# Patient Record
Sex: MALE | Race: OTHER | HISPANIC OR LATINO | URBAN - METROPOLITAN AREA
[De-identification: names, ages, dates, MRNs, and addresses within clinical notes are randomized per-mention and may not be internally consistent; named-entity substitution may affect disease eponyms.]

---

## 2017-02-01 ENCOUNTER — OUTPATIENT (OUTPATIENT)
Dept: OUTPATIENT SERVICES | Facility: HOSPITAL | Age: 62
LOS: 1 days | End: 2017-02-01
Payer: COMMERCIAL

## 2017-02-01 PROCEDURE — 75574 CT ANGIO HRT W/3D IMAGE: CPT | Mod: 26

## 2017-02-01 PROCEDURE — 75574 CT ANGIO HRT W/3D IMAGE: CPT

## 2018-07-19 ENCOUNTER — APPOINTMENT (OUTPATIENT)
Dept: UROLOGY | Facility: CLINIC | Age: 63
End: 2018-07-19

## 2018-08-24 ENCOUNTER — APPOINTMENT (OUTPATIENT)
Dept: UROLOGY | Facility: CLINIC | Age: 63
End: 2018-08-24
Payer: MEDICARE

## 2018-08-24 PROCEDURE — 99205 OFFICE O/P NEW HI 60 MIN: CPT

## 2018-08-24 RX ORDER — FINASTERIDE 5 MG/1
5 TABLET, FILM COATED ORAL DAILY
Qty: 90 | Refills: 3 | Status: ACTIVE | COMMUNITY
Start: 2018-08-24 | End: 1900-01-01

## 2018-08-27 LAB — PSA SERPL-MCNC: <0.01 NG/ML

## 2019-05-09 ENCOUNTER — APPOINTMENT (OUTPATIENT)
Dept: UROLOGY | Facility: CLINIC | Age: 64
End: 2019-05-09
Payer: MEDICARE

## 2019-05-09 VITALS — HEART RATE: 73 BPM | SYSTOLIC BLOOD PRESSURE: 121 MMHG | DIASTOLIC BLOOD PRESSURE: 69 MMHG | TEMPERATURE: 98.3 F

## 2019-05-09 PROCEDURE — 99213 OFFICE O/P EST LOW 20 MIN: CPT

## 2019-05-09 NOTE — PHYSICAL EXAM
[Urinary Bladder Findings] : the bladder was normal on palpation [Penis Abnormality] : normal uncircumcised penis [Urethral Meatus] : meatus normal [Epididymis] : the epididymides were normal [Scrotum] : the scrotum was normal [Testes Tenderness] : no tenderness of the testes [FreeTextEntry1] : limited SFPL. ventral plaque nontender [Testes Mass (___cm)] : there were no testicular masses

## 2019-05-09 NOTE — ASSESSMENT
[FreeTextEntry1] : peyronies diesease\par no curvature\par no interest in treatment\par will monitor\par \par prostate cancer\par PSA today\par f/u 1 year

## 2019-05-09 NOTE — HISTORY OF PRESENT ILLNESS
[FreeTextEntry1] : RALP 2012\par PSA undetectable\par mild ED\par no interest PDE5i\par lump in penis x 1 year\par no curvature\par

## 2019-05-13 LAB — PSA SERPL-MCNC: <0.01 NG/ML

## 2019-07-12 ENCOUNTER — APPOINTMENT (OUTPATIENT)
Dept: UROLOGY | Facility: CLINIC | Age: 64
End: 2019-07-12
Payer: MEDICARE

## 2019-07-12 VITALS — HEART RATE: 73 BPM | SYSTOLIC BLOOD PRESSURE: 146 MMHG | DIASTOLIC BLOOD PRESSURE: 89 MMHG

## 2019-07-12 PROCEDURE — 99213 OFFICE O/P EST LOW 20 MIN: CPT

## 2019-07-12 NOTE — HISTORY OF PRESENT ILLNESS
[FreeTextEntry1] : complained of dysuria few weeks ago\par symptoms resovled but are intermtitent\par told may have urianry infectons vs kidney stone\par no flank pain\par no nausea vomting\par

## 2019-07-22 LAB
APPEARANCE: CLEAR
BACTERIA UR CULT: NORMAL
BACTERIA: NEGATIVE
BILIRUBIN URINE: NEGATIVE
BLOOD URINE: ABNORMAL
COLOR: YELLOW
GLUCOSE QUALITATIVE U: NEGATIVE
HYALINE CASTS: 0 /LPF
KETONES URINE: NEGATIVE
LEUKOCYTE ESTERASE URINE: NEGATIVE
MICROSCOPIC-UA: NORMAL
NITRITE URINE: NEGATIVE
PH URINE: 6.5
PROTEIN URINE: NEGATIVE
RED BLOOD CELLS URINE: 12 /HPF
SPECIFIC GRAVITY URINE: 1.02
SQUAMOUS EPITHELIAL CELLS: 0 /HPF
UROBILINOGEN URINE: NORMAL
WHITE BLOOD CELLS URINE: 1 /HPF

## 2019-07-24 ENCOUNTER — RESULT REVIEW (OUTPATIENT)
Age: 64
End: 2019-07-24

## 2019-08-01 ENCOUNTER — FORM ENCOUNTER (OUTPATIENT)
Age: 64
End: 2019-08-01

## 2019-08-02 ENCOUNTER — APPOINTMENT (OUTPATIENT)
Dept: CT IMAGING | Facility: HOSPITAL | Age: 64
End: 2019-08-02
Payer: MEDICARE

## 2019-08-02 ENCOUNTER — OUTPATIENT (OUTPATIENT)
Dept: OUTPATIENT SERVICES | Facility: HOSPITAL | Age: 64
LOS: 1 days | End: 2019-08-02
Payer: MEDICARE

## 2019-08-02 PROCEDURE — 74178 CT ABD&PLV WO CNTR FLWD CNTR: CPT

## 2019-08-02 PROCEDURE — 74178 CT ABD&PLV WO CNTR FLWD CNTR: CPT | Mod: 26

## 2020-07-06 ENCOUNTER — TRANSCRIPTION ENCOUNTER (OUTPATIENT)
Age: 65
End: 2020-07-06

## 2020-07-17 ENCOUNTER — APPOINTMENT (OUTPATIENT)
Dept: UROLOGY | Facility: CLINIC | Age: 65
End: 2020-07-17
Payer: MEDICARE

## 2020-07-17 VITALS
SYSTOLIC BLOOD PRESSURE: 132 MMHG | TEMPERATURE: 97.7 F | DIASTOLIC BLOOD PRESSURE: 86 MMHG | WEIGHT: 153 LBS | BODY MASS INDEX: 25.49 KG/M2 | HEART RATE: 88 BPM | HEIGHT: 65 IN

## 2020-07-17 PROCEDURE — 99213 OFFICE O/P EST LOW 20 MIN: CPT

## 2020-07-17 NOTE — HISTORY OF PRESENT ILLNESS
[FreeTextEntry1] : s/p RALP 2012\par PSA undetectable last 5/2019\par CT scal 8/2019 small nonobstructing stones bilaterally\par no gross hematuria\par micorhematuria at last visit never proceeded to cysto\par bothereed by plaque on penis\par

## 2020-07-17 NOTE — ASSESSMENT
[FreeTextEntry1] : micorhematuria \par reiterated needed for cystoscopy\par will consider\par UA UCX today\par PSA with PCP\par f/u 1 year

## 2020-07-17 NOTE — PHYSICAL EXAM
[Penis Abnormality] : normal uncircumcised penis [FreeTextEntry1] : limited stretched flaccid length

## 2020-07-17 NOTE — LETTER BODY
[Dear  ___] : Dear  [unfilled], [FreeTextEntry1] : I saw  INGRID NARAYAN back in follow up today. Please see the attached note for full details.\par \par Thank you very much for allowing me to participate in the care of this patient. If you have any questions please feel free to call me at any time. \par \par \par Sincerely yours,\par \par \par \par Naveed Lopez MD, LAURIE\par Director, Male Fertility and Microsurgery\par  of Urology\par [facility]\par \par \par This letter has been dictated using computer software but not reviewed to expedite transmission.\par  [FreeTextEntry2] : miguel Macias MD\par [facility]\par [address]\[place] [address]

## 2020-07-21 LAB
APPEARANCE: CLEAR
BACTERIA UR CULT: NORMAL
BACTERIA: NEGATIVE
BILIRUBIN URINE: NEGATIVE
BLOOD URINE: NEGATIVE
CALCIUM OXALATE CRYSTALS: ABNORMAL
COLOR: YELLOW
GLUCOSE QUALITATIVE U: ABNORMAL
HYALINE CASTS: 0 /LPF
KETONES URINE: NEGATIVE
LEUKOCYTE ESTERASE URINE: NEGATIVE
MICROSCOPIC-UA: NORMAL
NITRITE URINE: NEGATIVE
PH URINE: 6.5
PROTEIN URINE: NEGATIVE
RED BLOOD CELLS URINE: 3 /HPF
SPECIFIC GRAVITY URINE: 1.02
SQUAMOUS EPITHELIAL CELLS: 0 /HPF
UROBILINOGEN URINE: NORMAL
WHITE BLOOD CELLS URINE: 1 /HPF

## 2020-07-28 LAB — URINE CYTOLOGY: NORMAL

## 2020-08-06 ENCOUNTER — APPOINTMENT (OUTPATIENT)
Dept: UROLOGY | Facility: CLINIC | Age: 65
End: 2020-08-06
Payer: MEDICARE

## 2020-08-06 VITALS — TEMPERATURE: 98.8 F | SYSTOLIC BLOOD PRESSURE: 164 MMHG | HEART RATE: 79 BPM | DIASTOLIC BLOOD PRESSURE: 90 MMHG

## 2020-08-06 PROCEDURE — 52000 CYSTOURETHROSCOPY: CPT

## 2020-08-11 LAB — PSA SERPL-MCNC: 0.03 NG/ML

## 2021-07-16 ENCOUNTER — APPOINTMENT (OUTPATIENT)
Dept: UROLOGY | Facility: CLINIC | Age: 66
End: 2021-07-16

## 2021-12-01 ENCOUNTER — NON-APPOINTMENT (OUTPATIENT)
Age: 66
End: 2021-12-01

## 2021-12-15 ENCOUNTER — APPOINTMENT (OUTPATIENT)
Dept: NEPHROLOGY | Facility: CLINIC | Age: 66
End: 2021-12-15
Payer: MEDICARE

## 2021-12-15 VITALS
DIASTOLIC BLOOD PRESSURE: 78 MMHG | WEIGHT: 155 LBS | SYSTOLIC BLOOD PRESSURE: 133 MMHG | BODY MASS INDEX: 25.83 KG/M2 | HEART RATE: 76 BPM | HEIGHT: 65 IN

## 2021-12-15 DIAGNOSIS — Z63.5 DISRUPTION OF FAMILY BY SEPARATION AND DIVORCE: ICD-10-CM

## 2021-12-15 DIAGNOSIS — H93.19 TINNITUS, UNSPECIFIED EAR: ICD-10-CM

## 2021-12-15 DIAGNOSIS — E78.5 HYPERLIPIDEMIA, UNSPECIFIED: ICD-10-CM

## 2021-12-15 PROCEDURE — 99204 OFFICE O/P NEW MOD 45 MIN: CPT

## 2021-12-15 SDOH — SOCIAL STABILITY - SOCIAL INSECURITY: DISRUPTION OF FAMILY BY SEPARATION AND DIVORCE: Z63.5

## 2021-12-15 NOTE — CONSULT LETTER
[Dear  ___] : Dear  [unfilled], [Consult Letter:] : I had the pleasure of evaluating your patient, [unfilled]. [Please see my note below.] : Please see my note below. [Consult Closing:] : Thank you very much for allowing me to participate in the care of this patient.  If you have any questions, please do not hesitate to contact me. [Sincerely,] : Sincerely, [FreeTextEntry2] : Baljit [FreeTextEntry3] : Sincerely, \par \par Vinicius Monreal MD, FACP

## 2021-12-15 NOTE — HISTORY OF PRESENT ILLNESS
[FreeTextEntry1] : 66-year-old man with a history of bilateral kidney stones, measuring 2 to 3 mm on CT in August 2019.  He is referred now by Dr. Smiley , because of a urinalysis showing occult blood but only 0-2 RBCs.  His creatinine last month was 1.0 with a GFR of 76.  His K was 4.8.  His recent urine pH was 5 with uric acid crystals noted.  No stone has ever been identified or analyzed.  He has a history of prostate cancer, status post surgery about 10 years ago by Dr. Lopez.  He denies any flank pain or gross hematuria.  He recalls perhaps one UTI several years ago but not on a recurrent basis.  His BP has generally been normal in the past -it was 132/86 recently at Dr. SUERO .  He has hyperlipidemia treated with rosuvastatin.

## 2021-12-15 NOTE — ASSESSMENT
[FreeTextEntry1] : 66-year-old man with microscopic hematuria, in the context of prior small bilateral kidney stones -judging by the urine pH of 5 and uric acid crystals on a previous UA, I suspect that uric acid is at least partially involved in the composition of the stones.  He is not currently symptomatic from them.  I have ordered a kidney and bladder ultrasound, and labs to include BMP, urinalysis, urine cytology, and urine microalbumin.  We will watch his BP which is borderline, and may need 24-hour ambulatory monitoring once we have the stone/hematuria situation under control.  We will likely also get a 24-hour urine for Litholink evaluation of potential stone forming factors.  He will return in about 4 months pending outcome of the current evaluation.

## 2021-12-15 NOTE — PHYSICAL EXAM
[General Appearance - Alert] : alert [General Appearance - In No Acute Distress] : in no acute distress [Sclera] : the sclera and conjunctiva were normal [PERRL With Normal Accommodation] : pupils were equal in size, round, and reactive to light [Outer Ear] : the ears and nose were normal in appearance [Neck Appearance] : the appearance of the neck was normal [Neck Cervical Mass (___cm)] : no neck mass was observed [Jugular Venous Distention Increased] : there was no jugular-venous distention [Auscultation Breath Sounds / Voice Sounds] : lungs were clear to auscultation bilaterally [Heart Rate And Rhythm] : heart rate was normal and rhythm regular [Heart Sounds] : normal S1 and S2 [Heart Sounds Gallop] : no gallops [Murmurs] : no murmurs [Heart Sounds Pericardial Friction Rub] : no pericardial rub [Edema] : there was no peripheral edema [No CVA Tenderness] : no ~M costovertebral angle tenderness [Abnormal Walk] : normal gait [Nail Clubbing] : no clubbing  or cyanosis of the fingernails [Musculoskeletal - Swelling] : no joint swelling seen [Motor Tone] : muscle strength and tone were normal [Skin Color & Pigmentation] : normal skin color and pigmentation [Skin Turgor] : normal skin turgor [] : no rash [Deep Tendon Reflexes (DTR)] : deep tendon reflexes were 2+ and symmetric [No Focal Deficits] : no focal deficits [Oriented To Time, Place, And Person] : oriented to person, place, and time [Impaired Insight] : insight and judgment were intact [Affect] : the affect was normal 2018 13:20

## 2021-12-16 ENCOUNTER — APPOINTMENT (OUTPATIENT)
Dept: UROLOGY | Facility: CLINIC | Age: 66
End: 2021-12-16
Payer: MEDICARE

## 2021-12-16 VITALS — TEMPERATURE: 97.9 F | DIASTOLIC BLOOD PRESSURE: 89 MMHG | HEART RATE: 71 BPM | SYSTOLIC BLOOD PRESSURE: 151 MMHG

## 2021-12-16 PROCEDURE — 99213 OFFICE O/P EST LOW 20 MIN: CPT

## 2021-12-16 NOTE — HISTORY OF PRESENT ILLNESS
[FreeTextEntry1] : Mr. Perales is here for follow up \par \par s/p RALP 2012\par PSA undetectable last 1/12/20211 (done elsewhere and reviewed on patient's phone)\par CT scal 8/2019 small nonobstructing stones bilaterally\par \par \par bothereed by plaque on penis\par \par UA 11//23/2021 +2 RBCs\par \par \par renal and bladder ultrasound on 12/16/2021\par BILATERAL Non obstructing RENAL CALCULI\par RIGHT kidney: 4 mm and 6 mm lower polar stones\par left kidney 9 mm mid polar stones \par PVR: 17\par \par negative previous microhematuria workup

## 2021-12-16 NOTE — ASSESSMENT
[FreeTextEntry1] : microhematuria\par small nonobstucting stones in kidney\par repeat renal ultrasound 1 year\par \par prostate cancer\par repeat PSA 1year\par

## 2022-04-14 ENCOUNTER — APPOINTMENT (OUTPATIENT)
Dept: NEPHROLOGY | Facility: CLINIC | Age: 67
End: 2022-04-14
Payer: MEDICARE

## 2022-04-14 VITALS
HEIGHT: 65 IN | SYSTOLIC BLOOD PRESSURE: 124 MMHG | HEART RATE: 72 BPM | WEIGHT: 152 LBS | DIASTOLIC BLOOD PRESSURE: 78 MMHG | BODY MASS INDEX: 25.33 KG/M2

## 2022-04-14 DIAGNOSIS — I34.0 NONRHEUMATIC MITRAL (VALVE) INSUFFICIENCY: ICD-10-CM

## 2022-04-14 PROCEDURE — 99215 OFFICE O/P EST HI 40 MIN: CPT

## 2022-04-14 NOTE — PHYSICAL EXAM
[General Appearance - Alert] : alert [General Appearance - In No Acute Distress] : in no acute distress [Sclera] : the sclera and conjunctiva were normal [PERRL With Normal Accommodation] : pupils were equal in size, round, and reactive to light [Outer Ear] : the ears and nose were normal in appearance [Neck Appearance] : the appearance of the neck was normal [Neck Cervical Mass (___cm)] : no neck mass was observed [Jugular Venous Distention Increased] : there was no jugular-venous distention [Auscultation Breath Sounds / Voice Sounds] : lungs were clear to auscultation bilaterally [Heart Rate And Rhythm] : heart rate was normal and rhythm regular [Heart Sounds] : normal S1 and S2 [Heart Sounds Gallop] : no gallops [Murmurs] : no murmurs [Heart Sounds Pericardial Friction Rub] : no pericardial rub [Edema] : there was no peripheral edema [No CVA Tenderness] : no ~M costovertebral angle tenderness [Abnormal Walk] : normal gait [Musculoskeletal - Swelling] : no joint swelling seen [Nail Clubbing] : no clubbing  or cyanosis of the fingernails [Motor Tone] : muscle strength and tone were normal [Skin Color & Pigmentation] : normal skin color and pigmentation [Skin Turgor] : normal skin turgor [] : no rash [Deep Tendon Reflexes (DTR)] : deep tendon reflexes were 2+ and symmetric [No Focal Deficits] : no focal deficits [Oriented To Time, Place, And Person] : oriented to person, place, and time [Impaired Insight] : insight and judgment were intact [Affect] : the affect was normal

## 2022-04-14 NOTE — HISTORY OF PRESENT ILLNESS
[FreeTextEntry1] : 66-year-old man with a history of bilateral kidney stones seen on CT in August 2019.  He was referred last year by Dr. Smiley with a urinalysis showing occult blood but the sediment containing only 0-2 RBCs.  At that time his creatinine was 1.0 with a GFR of 76, K4.8, urine pH 5 with uric acid crystals noted.  He also has a history of prostate cancer.  When I last saw him in December, he denied any flank pain or gross hematuria.  His BP was 133/78.  He went to the [place] after that and experienced flank pain, prompting evaluation.  He was found to have 3+ blood on urinalysis with no protein, creatinine 1.02, BUN 15.  A CT scan on March 2 showed calcium containing stones in both right and left kidneys.  He then passed 2 stones spontaneously, which were analyzed and 100% calcium oxalate.  He was begun on sodium bicarbonate 500 mg daily and potassium citrate 99 mg twice daily, but he has only been taking it once daily.  He was told to avoid dairy products as much as possible and to drink more fluid.  I reviewed his films and it appears there may be a small stone still remaining in the left kidney.  He has no left flank pain but does have occasional very mild discomfort in the right lower back area.  He has very minimal dysuria occasionally.

## 2022-04-14 NOTE — ASSESSMENT
[FreeTextEntry1] : 66-year-old man with a history of nephrolithiasis -he passed 2 calcium oxalate stones last month in the [place].  He is now on sodium bicarbonate and potassium citrate, but the dose of the latter is very low.  I am sending him for urinalysis and U ACR today.  I want to see if he has microalbuminuria -dipstick showed no protein recently.  Also, we will see if there are any red cells or crystals noted.  He previously had uric acid crystals noted on urinalysis.  In addition, I ordered a 24-hour urine from [facility] so we can assess all of the physical chemical properties that may act as risk factors for stone forming -pH, calcium, oxalate, phosphorus, citrate, etc.  I emphasized the need for high fluid intake he has already cut back on his dairy products and is drinking soy milk instead of cow's milk.  He will return in 5 weeks and we will review his Litholink results.  I suspect he will need more potassiums citrate.   I have asked him to take his current dose twice daily.  Our goal will probably be to get his urine citrate up to about 500 mg.  Time spent 45 minutes.

## 2022-04-18 LAB
APPEARANCE: CLEAR
BACTERIA: NEGATIVE
BILIRUBIN URINE: NEGATIVE
BLOOD URINE: ABNORMAL
COLOR: NORMAL
CREAT SPEC-SCNC: 116 MG/DL
GLUCOSE QUALITATIVE U: NEGATIVE
HYALINE CASTS: 0 /LPF
KETONES URINE: NEGATIVE
LEUKOCYTE ESTERASE URINE: NEGATIVE
MICROALBUMIN 24H UR DL<=1MG/L-MCNC: <1.2 MG/DL
MICROALBUMIN/CREAT 24H UR-RTO: NORMAL MG/G
MICROSCOPIC-UA: NORMAL
NITRITE URINE: NEGATIVE
PH URINE: 7
PROTEIN URINE: NEGATIVE
RED BLOOD CELLS URINE: 13 /HPF
SPECIFIC GRAVITY URINE: 1.02
SQUAMOUS EPITHELIAL CELLS: 0 /HPF
UROBILINOGEN URINE: NORMAL
WHITE BLOOD CELLS URINE: 0 /HPF

## 2022-05-26 ENCOUNTER — APPOINTMENT (OUTPATIENT)
Dept: UROLOGY | Facility: CLINIC | Age: 67
End: 2022-05-26
Payer: MEDICARE

## 2022-05-26 ENCOUNTER — APPOINTMENT (OUTPATIENT)
Dept: NEPHROLOGY | Facility: CLINIC | Age: 67
End: 2022-05-26
Payer: MEDICARE

## 2022-05-26 VITALS
HEART RATE: 69 BPM | DIASTOLIC BLOOD PRESSURE: 79 MMHG | SYSTOLIC BLOOD PRESSURE: 117 MMHG | TEMPERATURE: 97.9 F | OXYGEN SATURATION: 97 %

## 2022-05-26 VITALS — HEIGHT: 65 IN | WEIGHT: 162 LBS | BODY MASS INDEX: 26.99 KG/M2

## 2022-05-26 DIAGNOSIS — N20.0 CALCULUS OF KIDNEY: ICD-10-CM

## 2022-05-26 DIAGNOSIS — Z80.49 FAMILY HISTORY OF MALIGNANT NEOPLASM OF OTHER GENITAL ORGANS: ICD-10-CM

## 2022-05-26 DIAGNOSIS — Z82.49 FAMILY HISTORY OF ISCHEMIC HEART DISEASE AND OTHER DISEASES OF THE CIRCULATORY SYSTEM: ICD-10-CM

## 2022-05-26 DIAGNOSIS — N39.41 URGE INCONTINENCE: ICD-10-CM

## 2022-05-26 DIAGNOSIS — E78.00 PURE HYPERCHOLESTEROLEMIA, UNSPECIFIED: ICD-10-CM

## 2022-05-26 DIAGNOSIS — Z85.46 PERSONAL HISTORY OF MALIGNANT NEOPLASM OF PROSTATE: ICD-10-CM

## 2022-05-26 DIAGNOSIS — R10.2 PELVIC AND PERINEAL PAIN: ICD-10-CM

## 2022-05-26 PROCEDURE — 99214 OFFICE O/P EST MOD 30 MIN: CPT

## 2022-05-26 PROCEDURE — 99213 OFFICE O/P EST LOW 20 MIN: CPT

## 2022-05-26 NOTE — ASSESSMENT
[FreeTextEntry1] : Prostate cancer \par PSA <0.Undetectable last Dec  2021\par repeat PSA  in a year \par \par Urinary urge incontinence \par Informed of treatment options\par mildly bothersome - no interest in medication\par continue Kegel exercise \par \par Pelvic Pain\par Discussed causes and treatment options\par Advised warm tub baths 15 - 20 mins daily\par NSAIDs for discomfort\par Informed of Alpha blocker - no interest \par if pain or discomfort persists, will consider pelvic floor physical therapy. \par \par labs: UA, UC, PSA \par \par Follow up yearly

## 2022-05-26 NOTE — PHYSICAL EXAM
[General Appearance - Well Developed] : well developed [General Appearance - Well Nourished] : well nourished [Normal Appearance] : normal appearance [Well Groomed] : well groomed [General Appearance - In No Acute Distress] : no acute distress [Abdomen Soft] : soft [Abdomen Tenderness] : non-tender [Costovertebral Angle Tenderness] : no ~M costovertebral angle tenderness [Urethral Meatus] : meatus normal [Penis Abnormality] : normal uncircumcised penis [Urinary Bladder Findings] : the bladder was normal on palpation [Scrotum] : the scrotum was normal [Testes Mass (___cm)] : there were no testicular masses [Edema] : no peripheral edema [] : no respiratory distress [Respiration, Rhythm And Depth] : normal respiratory rhythm and effort [Exaggerated Use Of Accessory Muscles For Inspiration] : no accessory muscle use [Oriented To Time, Place, And Person] : oriented to person, place, and time [Affect] : the affect was normal [Mood] : the mood was normal [Not Anxious] : not anxious [Normal Station and Gait] : the gait and station were normal for the patient's age [No Focal Deficits] : no focal deficits [No Palpable Adenopathy] : no palpable adenopathy [FreeTextEntry1] : palpable penile plague, bilateral 15 cc testis

## 2022-05-26 NOTE — ASSESSMENT
[FreeTextEntry1] : 66-year-old man with calcium oxalate stones -I have asked him to further increase his fluid intake, particularly during the summer months when insensible losses or greater.  I am also starting him on potassiums citrate 15 M EQ's twice daily.  He will repeat a Litholink 24-hour urine in 6 weeks to recheck volume and urinary citrate particularly.

## 2022-05-26 NOTE — HISTORY OF PRESENT ILLNESS
[FreeTextEntry1] : 66-year-old man with a history of bilateral kidney stones noted on CT 3 years ago.  He was referred by Dr. Smiley last year with microscopic hematuria.  Creatinine was 1.0 with a GFR of 76.  Uric acid crystals were noted, but he has developed calcium oxalate stones.  While visiting in the [place] he was found to have 3+ blood on urinalysis with a creatinine of 1.02 and BUN of 15.  A CT scan 2 months ago showed calcium containing stones in both kidneys, and both stones were passed spontaneously.  When analyzed, they were both 100% calcium oxalate.  He was given potassiums citrate twice a day but he was only taking it once and the dose was very tiny.  He was told to avoid dairy products and he has minimized cream cheese and regular cheese since then.  He has tried to drink more fluid.  He collected a 24-hour urine for Litholink 3 weeks ago, showing a volume of 1.84 L, with a urinary calcium of 70, urinary oxalate of 33, urinary citrate slightly low at 489, urine pH of 6.5.  He has no urinary symptoms at present.

## 2022-05-26 NOTE — CONSULT LETTER
[Dear  ___] : Dear  [unfilled], [Consult Letter:] : I had the pleasure of evaluating your patient, [unfilled]. [Please see my note below.] : Please see my note below. [Consult Closing:] : Thank you very much for allowing me to participate in the care of this patient.  If you have any questions, please do not hesitate to contact me. [Sincerely,] : Sincerely, [FreeTextEntry2] : Dr Smiley

## 2022-05-31 ENCOUNTER — NON-APPOINTMENT (OUTPATIENT)
Age: 67
End: 2022-05-31

## 2022-05-31 LAB
APPEARANCE: CLEAR
BACTERIA UR CULT: NORMAL
BACTERIA: NEGATIVE
BILIRUBIN URINE: NEGATIVE
BLOOD URINE: ABNORMAL
COLOR: NORMAL
GLUCOSE QUALITATIVE U: NEGATIVE
HYALINE CASTS: 0 /LPF
KETONES URINE: NEGATIVE
LEUKOCYTE ESTERASE URINE: NEGATIVE
MICROSCOPIC-UA: NORMAL
NITRITE URINE: NEGATIVE
PH URINE: 6.5
PROTEIN URINE: NEGATIVE
PSA SERPL-MCNC: <0.01 NG/ML
RED BLOOD CELLS URINE: 0 /HPF
SPECIFIC GRAVITY URINE: 1.01
SQUAMOUS EPITHELIAL CELLS: 0 /HPF
UROBILINOGEN URINE: NORMAL
WHITE BLOOD CELLS URINE: 0 /HPF

## 2023-05-03 ENCOUNTER — NON-APPOINTMENT (OUTPATIENT)
Age: 68
End: 2023-05-03

## 2023-05-04 ENCOUNTER — APPOINTMENT (OUTPATIENT)
Dept: NEPHROLOGY | Facility: CLINIC | Age: 68
End: 2023-05-04
Payer: MEDICARE

## 2023-05-04 VITALS
SYSTOLIC BLOOD PRESSURE: 114 MMHG | HEART RATE: 72 BPM | WEIGHT: 162 LBS | HEIGHT: 65 IN | BODY MASS INDEX: 26.99 KG/M2 | DIASTOLIC BLOOD PRESSURE: 71 MMHG

## 2023-05-04 PROCEDURE — 99214 OFFICE O/P EST MOD 30 MIN: CPT

## 2023-05-04 RX ORDER — POTASSIUM CITRATE 15 MEQ/1
15 MEQ TABLET, EXTENDED RELEASE ORAL
Qty: 180 | Refills: 1 | Status: ACTIVE | COMMUNITY
Start: 2022-05-26 | End: 1900-01-01

## 2023-05-04 NOTE — ASSESSMENT
[FreeTextEntry1] : 67-year-old man with stable renal function in the normal range, well-controlled BP, normal urinalysis with no crystals or red cells -he will be having a urology visit later this week.  He never returned for the follow-up Litholink, but ran out of potassiums citrate so I am renewing that now.  He also is not drinking quite as much fluid as he probably needs to so I reminded him to keep drinking every hour.  He will return in 9 to 12 months unless a new issue arises.

## 2023-05-04 NOTE — PHYSICAL EXAM
[General Appearance - Alert] : alert [General Appearance - In No Acute Distress] : in no acute distress [Sclera] : the sclera and conjunctiva were normal [PERRL With Normal Accommodation] : pupils were equal in size, round, and reactive to light [Outer Ear] : the ears and nose were normal in appearance [Neck Appearance] : the appearance of the neck was normal [Jugular Venous Distention Increased] : there was no jugular-venous distention [Neck Cervical Mass (___cm)] : no neck mass was observed [Auscultation Breath Sounds / Voice Sounds] : lungs were clear to auscultation bilaterally [Heart Rate And Rhythm] : heart rate was normal and rhythm regular [Heart Sounds] : normal S1 and S2 [Heart Sounds Gallop] : no gallops [Murmurs] : no murmurs [Heart Sounds Pericardial Friction Rub] : no pericardial rub [Edema] : there was no peripheral edema [No CVA Tenderness] : no ~M costovertebral angle tenderness [Abnormal Walk] : normal gait [Nail Clubbing] : no clubbing  or cyanosis of the fingernails [Musculoskeletal - Swelling] : no joint swelling seen [Motor Tone] : muscle strength and tone were normal [Skin Color & Pigmentation] : normal skin color and pigmentation [Skin Turgor] : normal skin turgor [] : no rash [Deep Tendon Reflexes (DTR)] : deep tendon reflexes were 2+ and symmetric [No Focal Deficits] : no focal deficits [Oriented To Time, Place, And Person] : oriented to person, place, and time [Impaired Insight] : insight and judgment were intact [Affect] : the affect was normal

## 2023-05-04 NOTE — CONSULT LETTER
[Dear  ___] : Dear  [unfilled], [Consult Letter:] : I had the pleasure of evaluating your patient, [unfilled]. [Please see my note below.] : Please see my note below. [Consult Closing:] : Thank you very much for allowing me to participate in the care of this patient.  If you have any questions, please do not hesitate to contact me. [Sincerely,] : Sincerely, [FreeTextEntry2] : Dr Smiley [FreeTextEntry3] : Sincerely, \par \par Vinicius Monreal MD, FACP\par

## 2023-05-04 NOTE — HISTORY OF PRESENT ILLNESS
[FreeTextEntry1] : 67-year-old man with a history of bilateral kidney stones first noted 4 years ago.  He was referred by Dr. Smiley with micro scopic hematuria.  His renal function has remained stable over the last 5 years, currently with a creatinine of 1.  0.07 and a GFR 75.  His BP has been very well controlled, cholesterol has improved dramatically with LDL falling from 133 down to 58, A1c is 5.5.  So he is metabolically in excellent shape.  Current urinalysis shows no red cells and no crystals where he has previously had calcium oxalate and uric acid crystals.

## 2023-05-05 ENCOUNTER — APPOINTMENT (OUTPATIENT)
Dept: UROLOGY | Facility: CLINIC | Age: 68
End: 2023-05-05
Payer: MEDICARE

## 2023-05-05 VITALS — SYSTOLIC BLOOD PRESSURE: 145 MMHG | HEART RATE: 83 BPM | TEMPERATURE: 97.8 F | DIASTOLIC BLOOD PRESSURE: 88 MMHG

## 2023-05-05 DIAGNOSIS — R30.0 DYSURIA: ICD-10-CM

## 2023-05-05 PROCEDURE — 99214 OFFICE O/P EST MOD 30 MIN: CPT

## 2023-05-05 NOTE — PHYSICAL EXAM
[Normal Appearance] : normal appearance [Abdomen Soft] : soft [Abdomen Tenderness] : non-tender [] : no respiratory distress [Respiration, Rhythm And Depth] : normal respiratory rhythm and effort [Oriented To Time, Place, And Person] : oriented to person, place, and time [Affect] : the affect was normal [Normal Station and Gait] : the gait and station were normal for the patient's age

## 2023-05-05 NOTE — ASSESSMENT
[FreeTextEntry1] : 67M for annual check up\par \par Prostate Cancer\par - PSA undetectable, repeat 1 year\par \par Pelvic pain\par - Sitz Baths, NSAIDs\par - Alfuzosin in refractory\par \par UA, Ucx\par review by phone.\par

## 2023-05-05 NOTE — HISTORY OF PRESENT ILLNESS
[FreeTextEntry1] : 67M here for annual follow up, Hx of PCa s/p prostatectomy, pelvic pain, incontinence\par \par - PSA undetectable as of april/2023\par \par - some mild pelvic discomfort as discussed at previous visit. Patient has not tried NSAIDs or Sitz Baths. \par - occasional dysuria and nocturia x 1-2. - no evidence of infection in UA earlier this week.  \par - otherwise is feeling well and without complaints.\par - some incontinence - needs to continue kegels

## 2023-05-08 LAB
APPEARANCE: CLEAR
BACTERIA UR CULT: NORMAL
BACTERIA: NEGATIVE /HPF
BILIRUBIN URINE: NEGATIVE
BLOOD URINE: ABNORMAL
CALCIUM OXALATE CRYSTALS: PRESENT
CAST: 0 /LPF
COLOR: YELLOW
EPITHELIAL CELLS: 0 /HPF
GLUCOSE QUALITATIVE U: NEGATIVE MG/DL
KETONES URINE: NEGATIVE MG/DL
LEUKOCYTE ESTERASE URINE: NEGATIVE
MICROSCOPIC-UA: NORMAL
NITRITE URINE: NEGATIVE
PH URINE: 7.5
PROTEIN URINE: NEGATIVE MG/DL
RED BLOOD CELLS URINE: 14 /HPF
REVIEW: NORMAL
SPECIFIC GRAVITY URINE: 1.02
UROBILINOGEN URINE: 0.2 MG/DL
WHITE BLOOD CELLS URINE: 0 /HPF

## 2023-07-27 ENCOUNTER — APPOINTMENT (OUTPATIENT)
Dept: UROLOGY | Facility: CLINIC | Age: 68
End: 2023-07-27

## 2024-03-06 ENCOUNTER — TRANSCRIPTION ENCOUNTER (OUTPATIENT)
Age: 69
End: 2024-03-06

## 2024-03-06 RX ORDER — ROSUVASTATIN CALCIUM 5 MG/1
5 TABLET, FILM COATED ORAL
Qty: 90 | Refills: 1 | Status: ACTIVE | COMMUNITY

## 2024-03-14 ENCOUNTER — APPOINTMENT (OUTPATIENT)
Dept: UROLOGY | Facility: CLINIC | Age: 69
End: 2024-03-14
Payer: MEDICARE

## 2024-03-14 ENCOUNTER — LABORATORY RESULT (OUTPATIENT)
Age: 69
End: 2024-03-14

## 2024-03-14 VITALS
HEART RATE: 73 BPM | WEIGHT: 151 LBS | TEMPERATURE: 97.8 F | DIASTOLIC BLOOD PRESSURE: 99 MMHG | SYSTOLIC BLOOD PRESSURE: 162 MMHG | BODY MASS INDEX: 25.16 KG/M2 | HEIGHT: 65 IN

## 2024-03-14 DIAGNOSIS — C61 MALIGNANT NEOPLASM OF PROSTATE: ICD-10-CM

## 2024-03-14 PROCEDURE — 99213 OFFICE O/P EST LOW 20 MIN: CPT

## 2024-03-14 NOTE — HISTORY OF PRESENT ILLNESS
[FreeTextEntry1] : INGRID NARAYAN is a 68 year M presenting on 03/14/2024 PMH: PCa s/p RALP 2012, pelvic pain, incontinence, microhematuria  Microhematuria last year, never did workup.  h/o kidney stones 1x, passed on own. Has stopped taking potassium citrate. No urinary concerns. Sometimes feels ache in testicles. Similar to last visit. Hasn't been doing sitz baths. No ED concerns. Not interested in medication at this time.  No h/o tobacco  PSA: <0.1, 5/2023 <0.01, 5/2022 0.03, 8/2020 <0.01, 5/2019

## 2024-03-14 NOTE — PHYSICAL EXAM
[Well Groomed] : well groomed [Normal Appearance] : normal appearance [General Appearance - In No Acute Distress] : no acute distress [Respiration, Rhythm And Depth] : normal respiratory rhythm and effort [Exaggerated Use Of Accessory Muscles For Inspiration] : no accessory muscle use [Urethral Meatus] : meatus normal [Penis Abnormality] : normal uncircumcised penis [Scrotum] : the scrotum was normal [Testes Tenderness] : no tenderness of the testes [Testes Mass (___cm)] : there were no testicular masses [] : no rash [Normal Station and Gait] : the gait and station were normal for the patient's age [No Focal Deficits] : no focal deficits [Oriented To Time, Place, And Person] : oriented to person, place, and time [Affect] : the affect was normal [Mood] : the mood was normal [de-identified] : B/L 16cc testes, no varicoceles B/L

## 2024-03-15 LAB
APPEARANCE: CLEAR
BILIRUBIN URINE: NEGATIVE
BLOOD URINE: ABNORMAL
COLOR: YELLOW
GLUCOSE QUALITATIVE U: NEGATIVE MG/DL
KETONES URINE: NEGATIVE MG/DL
LEUKOCYTE ESTERASE URINE: NEGATIVE
NITRITE URINE: NEGATIVE
PH URINE: 6
PROTEIN URINE: NEGATIVE MG/DL
PSA SERPL-MCNC: <0.01 NG/ML
SPECIFIC GRAVITY URINE: 1.02
UROBILINOGEN URINE: 0.2 MG/DL

## 2024-03-18 LAB — BACTERIA UR CULT: NORMAL

## 2024-05-02 ENCOUNTER — APPOINTMENT (OUTPATIENT)
Dept: NEPHROLOGY | Facility: CLINIC | Age: 69
End: 2024-05-02
Payer: MEDICARE

## 2024-05-02 VITALS
BODY MASS INDEX: 24.99 KG/M2 | DIASTOLIC BLOOD PRESSURE: 66 MMHG | WEIGHT: 150 LBS | SYSTOLIC BLOOD PRESSURE: 112 MMHG | HEIGHT: 65 IN

## 2024-05-02 DIAGNOSIS — R31.21 ASYMPTOMATIC MICROSCOPIC HEMATURIA: ICD-10-CM

## 2024-05-02 DIAGNOSIS — N20.0 CALCULUS OF KIDNEY: ICD-10-CM

## 2024-05-02 DIAGNOSIS — R73.03 PREDIABETES.: ICD-10-CM

## 2024-05-02 DIAGNOSIS — N18.2 CHRONIC KIDNEY DISEASE, STAGE 2 (MILD): ICD-10-CM

## 2024-05-02 PROCEDURE — G2211 COMPLEX E/M VISIT ADD ON: CPT

## 2024-05-02 PROCEDURE — 99215 OFFICE O/P EST HI 40 MIN: CPT

## 2024-05-02 NOTE — HISTORY OF PRESENT ILLNESS
[FreeTextEntry1] : 68-year-old man with a history of bilateral kidney stones that were first noted 5 years ago.  He was referred by Dr. Smiley with microscopic hematuria.  Renal function has been stable over the last 5 years.  In fact, his latest labs in March showed a creatinine of 0.99, BUN 11, GFR 82, K4.4, LDL 77, A1c 5.7, and hemoglobin 16.7.  His renal function is perhaps even slightly better than previously.  He has not had any flank pain or hematuria.  He saw cardiologist 2 months ago, Dr. Wolfgang Hernandez, who did an echo that showed no LVH, and mild mitral regurgitation.  He went to the ER with chest pain on March 23, and a chest x-ray was okay -he was told he had noncardiac chest pain and discharged.  His BP has been high, 159/87 on March 7 with Sarah Celena, and then 162/99 on his urology visit March 14.  The cardiologist started him on losartan 100 mg daily and hydrochlorothiazide 25 mg daily.  He now notes lightheadedness when he stands up.  He also has had rectal bleeding and is scheduled for colonoscopy tomorrow.  He is scheduled to see Dr. CLARK on May 16.

## 2024-05-02 NOTE — CONSULT LETTER
[Dear  ___] : Dear  [unfilled], [Consult Letter:] : I had the pleasure of evaluating your patient, [unfilled]. [Please see my note below.] : Please see my note below. [Consult Closing:] : Thank you very much for allowing me to participate in the care of this patient.  If you have any questions, please do not hesitate to contact me. [FreeTextEntry2] : Dr Smiley [FreeTextEntry3] : Sincerely,   Vinicius Monreal MD, FACP

## 2024-05-02 NOTE — ASSESSMENT
[FreeTextEntry1] : 68-year-old male with a history of bilateral kidney stones, who has not had any flank pain or hematuria or suggestion of new stones.  Renal function has been stable and in normal range.  However his BP was high on multiple occasions recently, and has now responded dramatically to addition of ARB and diuretic.  In view of his lightheadedness and low BP today, I have asked him to reduce hydrochlorothiazide from 25 down to 12.5 mg daily, and continue losartan.  With regard to his colonoscopy tomorrow I advised him to drink an enormous amount of fluid today and tomorrow, even after the procedure to avoid dehydration.  I am also concerned about his relative erythrocytosis -he is a non-smoker with a hemoglobin of 16.7, in March, prior to starting diuretic.  So that may be up in the 17-18 range now.  I suggested that he donate blood at least twice a year, to avoid hyperviscosity and/or sludging.   It may even be worth a hematology consultation in regard to etiology of erythrocytosis.   Time spent 45 minutes